# Patient Record
Sex: FEMALE | Race: WHITE | NOT HISPANIC OR LATINO | Employment: OTHER | URBAN - METROPOLITAN AREA
[De-identification: names, ages, dates, MRNs, and addresses within clinical notes are randomized per-mention and may not be internally consistent; named-entity substitution may affect disease eponyms.]

---

## 2019-09-22 ENCOUNTER — HOSPITAL ENCOUNTER (EMERGENCY)
Facility: HOSPITAL | Age: 39
Discharge: HOME | End: 2019-09-22
Attending: EMERGENCY MEDICINE | Admitting: EMERGENCY MEDICINE
Payer: COMMERCIAL

## 2019-09-22 ENCOUNTER — APPOINTMENT (EMERGENCY)
Dept: RADIOLOGY | Facility: HOSPITAL | Age: 39
End: 2019-09-22
Payer: COMMERCIAL

## 2019-09-22 VITALS
BODY MASS INDEX: 24.54 KG/M2 | HEART RATE: 75 BPM | HEIGHT: 65 IN | TEMPERATURE: 98.7 F | OXYGEN SATURATION: 97 % | DIASTOLIC BLOOD PRESSURE: 57 MMHG | SYSTOLIC BLOOD PRESSURE: 119 MMHG | WEIGHT: 147.27 LBS | RESPIRATION RATE: 16 BRPM

## 2019-09-22 DIAGNOSIS — S92.351A CLOSED DISPLACED FRACTURE OF FIFTH METATARSAL BONE OF RIGHT FOOT, INITIAL ENCOUNTER: Primary | ICD-10-CM

## 2019-09-22 PROCEDURE — 73630 X-RAY EXAM OF FOOT: CPT

## 2019-09-22 PROCEDURE — 99283 EMERGENCY DEPT VISIT LOW MDM: CPT

## 2019-09-22 PROCEDURE — 73610 X-RAY EXAM OF ANKLE: CPT

## 2019-09-22 PROCEDURE — 99284 EMERGENCY DEPT VISIT MOD MDM: CPT | Performed by: PHYSICIAN ASSISTANT

## 2019-09-22 RX ORDER — OXYCODONE HYDROCHLORIDE AND ACETAMINOPHEN 5; 325 MG/1; MG/1
1 TABLET ORAL EVERY 6 HOURS PRN
Qty: 12 TABLET | Refills: 0 | Status: SHIPPED | OUTPATIENT
Start: 2019-09-22 | End: 2019-09-25

## 2019-09-22 RX ORDER — IBUPROFEN 400 MG/1
400 TABLET ORAL ONCE
Status: COMPLETED | OUTPATIENT
Start: 2019-09-22 | End: 2019-09-22

## 2019-09-22 RX ORDER — IBUPROFEN 400 MG/1
400 TABLET ORAL EVERY 6 HOURS PRN
Qty: 16 TABLET | Refills: 0 | Status: SHIPPED | OUTPATIENT
Start: 2019-09-22 | End: 2019-09-26

## 2019-09-22 RX ADMIN — IBUPROFEN 400 MG: 400 TABLET ORAL at 05:06

## 2019-09-22 NOTE — DISCHARGE INSTRUCTIONS
Take Motrin and Percocet as indicated  Follow-up with Podiatry  Follow-up with PCP  Follow up with emergency department symptoms persist assessment

## 2019-09-22 NOTE — ED PROVIDER NOTES
History  Chief Complaint   Patient presents with    Foot Injury     Pt states she was dancing and when she brought her right foot down she heard and felt a snap      Nba is a 35year old female with no pertinent medical or surgical history that presents to the emergency department with abrupt onset achy nonradiating right foot pain for 2 hours  Patient has no associated symptomatology  Patient states that while she was dancing at a party earlier this evening, she "heard and felt a snap when I put my foot down "  Patient verbalized inability to self ambulate at this time  Patient denies history frequent fractures or right foot injury  Patient denies palliative factors with ]provocative factors of pressure to right foot  Patient denies not effective treatment  Patient denies fevers, chills, nausea, and vomiting  Patient denies diarrhea, constipation, and urinary symptoms  Patient denies headaches, dizziness, tinnitus, meningeal, and vertiginous symptoms  Patient denies numbness, tingling, and loss of power  Patient denies sick contacts and recent travel  Patient denies recent fall and recent trauma  Patient denies chest pain, shortness of breath, and abdominal pain  Patient is not in acute distress  History provided by:  Patient   used: No        None       History reviewed  No pertinent past medical history  History reviewed  No pertinent surgical history  History reviewed  No pertinent family history  I have reviewed and agree with the history as documented  Social History     Tobacco Use    Smoking status: Never Smoker    Smokeless tobacco: Never Used   Substance Use Topics    Alcohol use: Yes     Comment: socially     Drug use: Never        Review of Systems   Constitutional: Negative for activity change, appetite change, chills and fever  HENT: Negative for congestion, postnasal drip, rhinorrhea, sinus pressure, sinus pain, sore throat and tinnitus  Eyes: Negative for photophobia and visual disturbance  Respiratory: Negative for cough, chest tightness and shortness of breath  Cardiovascular: Negative for chest pain and palpitations  Gastrointestinal: Negative for abdominal pain, constipation, diarrhea, nausea and vomiting  Genitourinary: Negative for difficulty urinating, dysuria, flank pain, frequency and urgency  Musculoskeletal: Positive for arthralgias  Negative for back pain, gait problem, neck pain and neck stiffness  Skin: Negative for pallor and rash  Allergic/Immunologic: Negative for environmental allergies and food allergies  Neurological: Negative for dizziness, weakness, numbness and headaches  Psychiatric/Behavioral: Negative for confusion  All other systems reviewed and are negative  Physical Exam  Physical Exam   Constitutional: She is oriented to person, place, and time  Vital signs are normal  She appears well-developed and well-nourished  She is active and cooperative  Non-toxic appearance  She does not have a sickly appearance  She does not appear ill  No distress  Patient appropriate for physical examination  Patient in no acute distress  Patient with verbalization using 6-8 word sentences of current symptomatology leading to ED presentation  HENT:   Head: Normocephalic and atraumatic  Right Ear: Hearing, tympanic membrane, external ear and ear canal normal  No drainage, swelling or tenderness  No mastoid tenderness  No decreased hearing is noted  Left Ear: Hearing, tympanic membrane, external ear and ear canal normal  No drainage, swelling or tenderness  No mastoid tenderness  No decreased hearing is noted  Nose: Nose normal    Mouth/Throat: Uvula is midline, oropharynx is clear and moist and mucous membranes are normal    Eyes: Pupils are equal, round, and reactive to light  Conjunctivae, EOM and lids are normal  Right eye exhibits no discharge  Left eye exhibits no discharge     Neck: Trachea normal, normal range of motion, full passive range of motion without pain and phonation normal  Neck supple  No JVD present  No tracheal tenderness, no spinous process tenderness and no muscular tenderness present  No neck rigidity  No tracheal deviation and normal range of motion present  Cardiovascular: Normal rate, regular rhythm, normal heart sounds, intact distal pulses and normal pulses  Pulses:       Radial pulses are 2+ on the right side, and 2+ on the left side  Dorsalis pedis pulses are 2+ on the right side, and 2+ on the left side  Posterior tibial pulses are 2+ on the right side, and 2+ on the left side  Pulmonary/Chest: Effort normal and breath sounds normal  No stridor  She has no decreased breath sounds  She has no wheezes  She has no rhonchi  She has no rales  She exhibits no tenderness, no bony tenderness and no crepitus  Abdominal: Soft  Bowel sounds are normal  She exhibits no distension  There is no tenderness  There is no rigidity, no rebound, no guarding and no CVA tenderness  Musculoskeletal: Normal range of motion  Right knee: Normal         Feet:    "!" on graphical documentation pictograph indicates tenderness and area of ecchymosis approximately 3 cm x 3 cm; tender skin intact    Passive ROM intact  Upper and lower extremity 5/5 bilaterally  Neurovascularly intact  No grinding or clicking of joints       Lymphadenopathy:        Head (right side): No submental, no submandibular, no tonsillar, no preauricular, no posterior auricular and no occipital adenopathy present  Head (left side): No submental, no submandibular, no tonsillar, no preauricular, no posterior auricular and no occipital adenopathy present  She has no cervical adenopathy  Right cervical: No superficial cervical, no deep cervical and no posterior cervical adenopathy present         Left cervical: No superficial cervical, no deep cervical and no posterior cervical adenopathy present  Neurological: She is alert and oriented to person, place, and time  She has normal strength and normal reflexes  No sensory deficit  GCS eye subscore is 4  GCS verbal subscore is 5  GCS motor subscore is 6  Reflex Scores:       Patellar reflexes are 2+ on the right side and 2+ on the left side  Skin: Skin is warm and intact  Capillary refill takes less than 2 seconds  She is not diaphoretic  Psychiatric: She has a normal mood and affect  Her speech is normal and behavior is normal  Judgment and thought content normal  Cognition and memory are normal    Nursing note and vitals reviewed  Vital Signs  ED Triage Vitals   Temperature Pulse Respirations Blood Pressure SpO2   09/22/19 0427 09/22/19 0425 09/22/19 0425 09/22/19 0425 09/22/19 0425   98 7 °F (37 1 °C) 75 16 119/57 97 %      Temp Source Heart Rate Source Patient Position - Orthostatic VS BP Location FiO2 (%)   09/22/19 0427 09/22/19 0425 -- 09/22/19 0425 --   Oral Monitor  Right arm       Pain Score       09/22/19 0425       4           Vitals:    09/22/19 0425   BP: 119/57   Pulse: 75         Visual Acuity      ED Medications  Medications   ibuprofen (MOTRIN) tablet 400 mg (400 mg Oral Given 9/22/19 0506)       Diagnostic Studies  Results Reviewed     None                 XR foot 3+ views RIGHT   Final Result by Shlomo Isidro MD (09/22 1936)      Oblique fracture of the 5th metatarsal shaft  Workstation performed: [id number]         XR ankle 3+ views RIGHT   Final Result by Shlomo Isidro MD (09/22 1937)      No acute osseous abnormality in the ankle  5th metatarsal shaft fracture as described in the separately dictated foot radiograph        Workstation performed: [id number]                    Procedures  Procedures       ED Course                               MDM  Number of Diagnoses or Management Options  Closed displaced fracture of fifth metatarsal bone of right foot, initial encounter: new and does not require workup     Amount and/or Complexity of Data Reviewed  Tests in the radiology section of CPT®: ordered and reviewed  Review and summarize past medical records: yes  Independent visualization of images, tracings, or specimens: yes    Risk of Complications, Morbidity, and/or Mortality  Presenting problems: low  Diagnostic procedures: low  Management options: low    Patient Progress  Patient progress: stable      Patiet is a 35year old female with no pertinent medical or surgical history that presents to the emergency department with abrupt onset achy nonradiating right foot pain for 2 hours  Right foot x-ray and right ankle x-ray Midshaft fracture, angulated of the 5th right metatarsal  Patient denies pain medication that was offered at this time  ED RN applied right posterior splint in the ED; patient's visible lower right extremity distal phalanges symmetrical in coloration to left lower extremity phalanges, with capillary refill less than 2 seconds, patient with ability to wiggle right lower extremity phalanges, patient reports no numbness or tingling s/p splint placement  Patient verbalizes comfort of right foot s/p splint placement  Ordered crutches; and patient demonstrates mechanical understanding of crutch use with ED clinical staff direct observation  Prescribed Motrin and Percocet   (12 doses), and counseled patient medication administration and side effects  Follow-up with Podiatry  Follow-up with PCP  Follow up with emergency department symptoms persist or exacerbate  Patient demonstrates verbal understanding of all imaging findings, discharge instructions, follow-up and verbalizes agreement with treatment plan  Patient hemodynamically stable in no acute distress at time of final evaluation          Disposition  Final diagnoses:   Closed displaced fracture of fifth metatarsal bone of right foot, initial encounter     Time reflects when diagnosis was documented in both MDM as applicable and the Disposition within this note     Time User Action Codes Description Comment    9/22/2019  5:35 AM Aman Muniz Add [[id number]] Closed displaced fracture of fifth metatarsal bone of right foot, initial encounter       ED Disposition     ED Disposition Condition Date/Time Comment    Discharge Stable Sun Sep 22, 2019  5:29 AM Gerber Marx discharge to home/self care  Follow-up Information     Follow up With Specialties Details Why Contact Info Additional Information    [facility] Call in 2 days for further evaluation of symptoms [address] [facility] [id number] [address] [facility], [address] [place] [address], [address] Call in 1 week for further evaluation of symptoms [address] [facility], [facility] [address] [phone] [facility], [address] [place] [address], [facility] Emergency Department Emergency Medicine Go to  As needed [address] [facility] [id number] [phone] [place] [facility], [address] [place], [place], [address]          Discharge Medication List as of 9/22/2019  5:36 AM      START taking these medications    Details   ibuprofen (MOTRIN) 400 mg tablet Take 1 tablet (400 mg total) by mouth every 6 (six) hours as needed for mild pain for up to 4 days, Starting Sun 9/22/2019, Until Thu 9/26/2019, Print      oxyCODONE-acetaminophen (PERCOCET) 5-325 mg per tablet Take 1 tablet by mouth every 6 (six) hours as needed for moderate pain for up to 3 daysMax Daily Amount: 4 tablets, Starting Sun 9/22/2019, Until Wed 9/25/2019, Print           No discharge procedures on file      ED Provider  Electronically Signed by           Arcadio Ferguson PA-C  09/22/19 0368